# Patient Record
Sex: MALE | Race: WHITE | NOT HISPANIC OR LATINO | Employment: FULL TIME | URBAN - METROPOLITAN AREA
[De-identification: names, ages, dates, MRNs, and addresses within clinical notes are randomized per-mention and may not be internally consistent; named-entity substitution may affect disease eponyms.]

---

## 2019-04-02 ENCOUNTER — TRANSFERRED RECORDS (OUTPATIENT)
Dept: HEALTH INFORMATION MANAGEMENT | Facility: CLINIC | Age: 24
End: 2019-04-02

## 2019-04-08 ENCOUNTER — PRE VISIT (OUTPATIENT)
Dept: OPHTHALMOLOGY | Facility: CLINIC | Age: 24
End: 2019-04-08

## 2019-04-08 RX ORDER — ERYTHROMYCIN 5 MG/G
0.5 OINTMENT OPHTHALMIC AT BEDTIME
COMMUNITY
End: 2019-05-22

## 2019-04-08 RX ORDER — DOXYCYCLINE HYCLATE 50 MG/1
50 CAPSULE ORAL 2 TIMES DAILY
COMMUNITY
End: 2019-05-22

## 2019-04-08 NOTE — TELEPHONE ENCOUNTER
April 8, 2019                  Pre-Visit Documentation: Eric Roberts is a 23 year old male      Chief Complaint   Patient presents with     Previsit     appt w/ Dr Jessica Patiño Evaluation     bilateral upper and lower lids, refered by Dr Dunphy       Current Outpatient Medications   Medication Sig Dispense Refill     doxycycline hyclate (VIBRAMYCIN) 50 MG capsule Take 50 mg by mouth 2 times daily       erythromycin (ROMYCIN) 5 MG/GM ophthalmic ointment Place 0.5 inches into both eyes At Bedtime         Mariah TREVINO OSC

## 2019-04-17 ENCOUNTER — OFFICE VISIT (OUTPATIENT)
Dept: OPHTHALMOLOGY | Facility: CLINIC | Age: 24
End: 2019-04-17
Payer: COMMERCIAL

## 2019-04-17 DIAGNOSIS — H00.11 CHALAZION OF RIGHT UPPER EYELID: Primary | ICD-10-CM

## 2019-04-17 DIAGNOSIS — H00.12 CHALAZION OF RIGHT LOWER EYELID: ICD-10-CM

## 2019-04-17 DIAGNOSIS — H00.14 CHALAZION LEFT UPPER EYELID: ICD-10-CM

## 2019-04-17 DIAGNOSIS — H00.15 CHALAZION LEFT LOWER EYELID: ICD-10-CM

## 2019-04-17 PROCEDURE — 67805 REMOVE EYELID LESIONS: CPT | Performed by: OPHTHALMOLOGY

## 2019-04-17 PROCEDURE — 99202 OFFICE O/P NEW SF 15 MIN: CPT | Mod: 25 | Performed by: OPHTHALMOLOGY

## 2019-04-17 ASSESSMENT — VISUAL ACUITY
OS_CC+: -2
CORRECTION_TYPE: CONTACTS
METHOD: SNELLEN - LINEAR
OD_CC+: -1
OS_CC: 20/20
OD_CC: 20/25

## 2019-04-17 NOTE — NURSING NOTE
Patient presents with:  Lesion On Both Lower Lids: Here at the request of Dr. Dunphy. Styes  Upper and lower lids.  Has Used doxycyline BID PO . Using EEC oint at bedtime each eye. Has used warm compresses.  The styes get worse as the day gets longer.       Referring Provider:  Erin M Dunphy, OD  [facility]  [address] [place] [address]        Ivett Vargas, COT

## 2019-04-17 NOTE — PROGRESS NOTES
Chief Complaint(s) and History of Present Illness(es)     Lesion On Both Lower Lids     Laterality: right lower lid, left upper lid, left lower lid and right   upper lid    Onset: 3 months ago    Comments: Here at the request of Dr. Dunphy. Styes  Upper and lower lids.    Has Used doxycyline  100 mg daily PO - now into week three. Using EEC oint at bedtime each eye. Has used   warm compresses.  The styes get worse as the day gets longer.             Assessment & Plan     Eric Roberts is a 23 year old male with the following diagnoses:   1. Chalazion of right upper eyelid    2. Chalazion of right lower eyelid    3. Chalazion left upper eyelid    4. Chalazion left lower eyelid       Offered kenalog injection versus incision and drainage.   Plan: Incision and drainage chalazion all four lids.     Doesn't feel doxy has helped, ok to discontinue. Also having GI side effects.   Discussed long term prevention methods warm compresses.        Attending Physician Attestation:  Complete documentation of historical and exam elements from today's encounter can be found in the full encounter summary report (not reduplicated in this progress note).  I personally obtained the chief complaint(s) and history of present illness.  I confirmed and edited as necessary the review of systems, past medical/surgical history, family history, social history, and examination findings as documented by others; and I examined the patient myself.  I personally reviewed the relevant tests, images, and reports as documented above.  I formulated and edited as necessary the assessment and plan and discussed the findings and management plan with the patient and family. - Armida Lopez MD    OPERATIVE NOTE: CHALAZION.    PRE-OPERATIVE DIAGNOSIS: Chalazion right upper lid, right lower lid, left upper lid and left lower lid.    POST-OPERATIVE DIAGNOSIS: Chalazion right upper lid, right lower lid, left upper lid and left lower lid.    PROCEDURE  PERFORMED: Incision and drainage of chalazion right upper lid, right lower lid, left upper lid and left lower lid.    SURGEON: Armida Lopez    ASSISTANT: None    ANESTHESIA: Local infiltration with 2% lidocaine with epinephrine.    COMPLICATIONS: None    ESTIMATED BLOOD LOSS:  <5mL    HISTORY AND INDICATIONS: Patient presented with an enlarging chalazion in the involved eyelid.  This failed conservative medical management.  After the risks, benefits and alternatives of the proposed procedure were explained, informed consent was obtained.     PROCEDURE: Patient was brought to the minor procedure room and placed supine on the operating table.  Anesthesia was as listed above.  The area was prepped and draped in the typical fashion.  A chalazion clamp was placed over the involved eyelid and the eyelid everted.  A crutiate incision was made over the chalazion and the lipogranulomatous material removed using the chalazion curette.  Scissors were used to break any septae.  The edges of the cruciate incision were excised using Francis scissors.  Hemostasis was obtained.  The lid was reverted to its normal position, the clamp removed, and the erythromycin antibiotic ointment applied.  This was repeated for the other three eyelids. The patient tolerated the procedure well and left in stable condition with a tube of antibiotic ointment.     I was present for the entire procedure. Armida Lopez

## 2019-05-22 ENCOUNTER — OFFICE VISIT (OUTPATIENT)
Dept: OPHTHALMOLOGY | Facility: CLINIC | Age: 24
End: 2019-05-22
Payer: COMMERCIAL

## 2019-05-22 DIAGNOSIS — H00.11 CHALAZION OF RIGHT UPPER EYELID: Primary | ICD-10-CM

## 2019-05-22 PROCEDURE — 99213 OFFICE O/P EST LOW 20 MIN: CPT | Mod: 25 | Performed by: OPHTHALMOLOGY

## 2019-05-22 PROCEDURE — 11900 INJECT SKIN LESIONS </W 7: CPT | Mod: RT | Performed by: OPHTHALMOLOGY

## 2019-05-22 RX ORDER — TRIAMCINOLONE ACETONIDE 40 MG/ML
4 INJECTION, SUSPENSION INTRA-ARTICULAR; INTRAMUSCULAR ONCE
Status: COMPLETED | OUTPATIENT
Start: 2019-05-22 | End: 2019-05-22

## 2019-05-22 RX ORDER — TRIAMCINOLONE ACETONIDE 40 MG/ML
40 INJECTION, SUSPENSION INTRA-ARTICULAR; INTRAMUSCULAR ONCE
Status: DISCONTINUED | OUTPATIENT
Start: 2019-05-22 | End: 2019-05-22 | Stop reason: DRUGHIGH

## 2019-05-22 RX ADMIN — TRIAMCINOLONE ACETONIDE 4 MG: 40 INJECTION, SUSPENSION INTRA-ARTICULAR; INTRAMUSCULAR at 10:31

## 2019-05-22 ASSESSMENT — VISUAL ACUITY
OD_CC: 20/20
OS_CC: 20/20
METHOD: SNELLEN - LINEAR
CORRECTION_TYPE: GLASSES

## 2019-05-22 NOTE — PROGRESS NOTES
Chief Complaint(s) and History of Present Illness(es)     Chalazion Follow Up     Laterality: right upper lid, left upper lid, left lower lid and right   lower lid    Comments: s/p Incision and drainage of chalazion right upper and lower   lid, left upper and lower lower lid on 04/17/2019  Stye RUL appeared 1 week after I&D. Recurrent styes change in size and   appearance throughout the day. Pt using lid hygiene WC 2-3 times daily.       As above - he thinks Right upper lid lump noticed about 1.5 weeks ago (not 1 week after procedure).   He has been using warm compresses, and lid hygiene.          Assessment & Plan     Eric Roberts is a 23 year old male with the following diagnoses:   No diagnosis found.     Recurrent chalazion Right upper lid, other three lids look good.  Discussed options.  Plan intralesional kenalog today.   Continue hot packs  F/u 5 weeks if persists for incision and drainage.     0.1 mL of kenalog 40 was injected into the Right upper lid. Vision was checked and found to be unchanged.    I was present for the entire procedure. Armida Lopez MD           Attending Physician Attestation:  Complete documentation of historical and exam elements from today's encounter can be found in the full encounter summary report (not reduplicated in this progress note).  I personally obtained the chief complaint(s) and history of present illness.  I confirmed and edited as necessary the review of systems, past medical/surgical history, family history, social history, and examination findings as documented by others; and I examined the patient myself.  I personally reviewed the relevant tests, images, and reports as documented above.  I formulated and edited as necessary the assessment and plan and discussed the findings and management plan with the patient and family. - Armida Lopez MD

## 2019-05-22 NOTE — NURSING NOTE
Patient presents with:  Chalazion Follow Up: s/p Incision and drainage of chalazion right upper and lower lid, left upper and lower lower lid on 04/17/2019  Stye RUL appeared 1 week after I&D. Recurrent styes change in size and appearance throughout the day. Pt using lid hygiene WC 2-3 times daily.      Referring Provider:  No referring provider defined for this encounter.        Rosenda Garcia, COA

## 2024-01-11 ENCOUNTER — VIRTUAL VISIT (OUTPATIENT)
Dept: FAMILY MEDICINE | Facility: CLINIC | Age: 29
End: 2024-01-11
Payer: COMMERCIAL

## 2024-01-11 DIAGNOSIS — Z11.3 SCREEN FOR STD (SEXUALLY TRANSMITTED DISEASE): Primary | ICD-10-CM

## 2024-01-11 PROCEDURE — 99202 OFFICE O/P NEW SF 15 MIN: CPT | Mod: 95 | Performed by: FAMILY MEDICINE

## 2024-01-11 RX ORDER — DOXYCYCLINE 100 MG/1
100 CAPSULE ORAL 2 TIMES DAILY
Qty: 14 CAPSULE | Refills: 0 | Status: SHIPPED | OUTPATIENT
Start: 2024-01-11 | End: 2024-01-18

## 2024-01-11 NOTE — PROGRESS NOTES
"    Instructions Relayed to Patient by Virtual Roomer:     Patient is active on [employer]:   Relayed following to patient: \"It looks like you are active on [employer], are you able to join the visit this way? If not, do you need us to send you a link now or would you like your provider to send a link via text or email when they are ready to initiate the visit?\"    Reminded patient to ensure they were logged on to virtual visit by arrival time listed. Documented in appointment notes if patient had flexibility to initiate visit sooner than arrival time. If pediatric virtual visit, ensured pediatric patient along with parent/guardian will be present for video visit.     Patient offered the website [email or web address] and/or phone number to [employer] Help line: [phone]   Fabrizio is a 28 year old who is being evaluated via a billable video visit.      How would you like to obtain your AVS? [email or web address]  If the video visit is dropped, the invitation should be resent by: Text to cell phone: [phone]  Will anyone else be joining your video visit? No        Assessment & Plan   Fabrizio was seen today for ureaplasma .    Diagnoses and all orders for this visit:    Screen for STD (sexually transmitted disease)    Other orders  -     REVIEW OF HEALTH MAINTENANCE PROTOCOL ORDERS  -     doxycycline hyclate (VIBRAMYCIN) 100 MG capsule; Take 1 capsule (100 mg) by mouth 2 times daily for 7 days     Treatment prescribed with a plan to test if symptomatic or recurrent cystitis in Girlfriend.   Use of Condoms advised for 1 weeks.  - doxycycline hyclate (VIBRAMYCIN) 100 MG capsule; Take 1 capsule (100 mg) by mouth 2 times daily for 7 days      Susan Sanchez MD  [facility] BOWEN Dinh is a 28 year old, presenting for the following health issues:  No chief complaint on file.    Asymptomatic patient without UTI symptoms, needing test for ureaplasma. His partner has had multiple cystitis concerns " with recent positive culture for Ureaplasma.  Genitourinary - Male  Description:   Dysuria (painful urination): no   Hematuria (blood in urine): no   Frequency: no   Are you urinating at night : no   Hesitancy (delay in urine): no   Retention (unable to empty): no   Decrease in urinary flow: no   Incontinence: no   Accompanying Signs & Symptoms:  Fever: no   Back/Flank pain: no   Urethral discharge: no   Testicle lumps/masses/pain: no   Nausea and/or vomiting: no   Abdominal pain: no   History:   History of frequent UTI's: no   History of kidney stones: no   History of hernias: no   Personal or Family history of Prostate problems: No  Sexually active: YES      Review of Systems   Constitutional, HEENT, cardiovascular, pulmonary, gi and gu systems are negative, except as otherwise noted.      Objective           Vitals:  No vitals were obtained today due to virtual visit.    Physical Exam   GENERAL: Healthy, alert and no distress  EYES: Eyes grossly normal to inspection.  No discharge or erythema, or obvious scleral/conjunctival abnormalities.  RESP: No audible wheeze, cough, or visible cyanosis.  No visible retractions or increased work of breathing.    SKIN: Visible skin clear. No significant rash, abnormal pigmentation or lesions.  NEURO: Cranial nerves grossly intact.  Mentation and speech appropriate for age.  PSYCH: Mentation appears normal, affect normal/bright, judgement and insight intact, normal speech and appearance well-groomed.            Video-Visit Details    Type of service:  Video Visit     Originating Location (pt. Location): Home    Distant Location (provider location):  Off-site  Platform used for Video Visit: [employer]      Answers submitted by the patient for this visit:  General Questionnaire (Submitted on 1/11/2024)  Chief Complaint: Chronic problems general questions HPI Form  What is the reason for your visit today? : My girl friend has had recurring UTIs. She did a urine test where they found  ureaplasma. we were instructed for me to get tested and if positive treated as well. i am setting uo this appointment to get lab orders for a urine test with the cuktures needed  How many servings of fruits and vegetables do you eat daily?: 0-1  On average, how many sweetened beverages do you drink each day (Examples: soda, juice, sweet tea, etc.  Do NOT count diet or artificially sweetened beverages)?: 0  How many minutes a day do you exercise enough to make your heart beat faster?: 30 to 60  How many days a week do you exercise enough to make your heart beat faster?: 4  How many days per week do you miss taking your medication?: 0

## 2024-02-25 ENCOUNTER — HEALTH MAINTENANCE LETTER (OUTPATIENT)
Age: 29
End: 2024-02-25

## 2025-03-15 ENCOUNTER — HEALTH MAINTENANCE LETTER (OUTPATIENT)
Age: 30
End: 2025-03-15